# Patient Record
(demographics unavailable — no encounter records)

---

## 2024-12-12 NOTE — HISTORY OF PRESENT ILLNESS
[FreeTextEntry1] : 63 year old male patient increased risk for CRC (father with CRC), presents for screening colonoscopy. 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  She went to the ED on 11/21/24 due to severe pain in her RLQ. CT scan showed no acute abdominopelvic abnormalities but showed a 7 mm pancreatic hypodensity so MRI was recommended.   MRI Abdomen/MR Cholangiogram showed normal morphology of the liver with left hepatic lobe cysts up to 1.2 cm, normal GB, normal biliary ducts, and normal pancreas without any cystic lesions and normal PD.  She has not noted any further abdominal pain. She has changed her diet to low Carb but has frequent bloating postprandially. She reported occasional constipation; she does not take any laxatives; she took senokot 2 at hs prior to the colonoscopy with good response.  No weight loss, blood in the stool, heartburn. She noted 2 episodes of dysphagia which has improved. No choking, coughing, or vomiting noted.  EGD 12/4/24 Impressions: Normal mucosa in the whole esophagus. (Biopsy). Erythema in the stomach compatible with non-erosive gastritis. (Biopsy). Normal mucosa in the whole examined duodenum. (Biopsy). Biopsies were negative for HP, IM, Celiac Disease, and Dysplasia.  Colonoscopy 12/4/24 Impressions: External hemorrhoids. Polyp (6 mm) in the ascending colon. (Polypectomy). Polyp (10 mm) in the cecum. (Polypectomy). The colon and TI were otherwise unremarkable. Biopsies of the polyps were TAs without HGD.   Prior colonoscopy was done 10/21 and showed a rectal polyp which was hyperplastic.

## 2024-12-12 NOTE — PHYSICAL EXAM
[Alert] : alert [Normal Voice/Communication] : normal voice/communication [No Acute Distress] : no acute distress [Well Developed] : well developed [Well Nourished] : well nourished [Sclera] : the sclera and conjunctiva were normal [Hearing Threshold Finger Rub Not [place]] : hearing was normal [Normal Lips/Gums] : the lips and gums were normal [Normal Appearance] : the appearance of the neck was normal [No Neck Mass] : no neck mass was observed [No Respiratory Distress] : no respiratory distress [No Acc Muscle Use] : no accessory muscle use [Respiration, Rhythm And Depth] : normal respiratory rhythm and effort [Auscultation Breath Sounds / Voice Sounds] : lungs were clear to auscultation bilaterally [Heart Rate And Rhythm] : heart rate was normal and rhythm regular [Normal S1, S2] : normal S1 and S2 [Bowel Sounds] : normal bowel sounds [Abdomen Tenderness] : non-tender [No Masses] : no abdominal mass palpated [Abdomen Soft] : soft [Abnormal Walk] : normal gait [Normal Color / Pigmentation] : normal skin color and pigmentation [Oriented To Time, Place, And Person] : oriented to person, place, and time

## 2024-12-12 NOTE — ASSESSMENT
[FreeTextEntry1] : 64 year old male patient increased risk for CRC (father with CRC), presents for screening colonoscopy. 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      CRC screening, at increased risk for CRC Colon polyp, TAs without HGD - Colonoscopy results discussed - Repeat colonoscopy in 3 yrs (2027)   Dysphagia, non specific, better now - EGD results discussed and was unremarkable  Pancreatic hypodensity on CT scan MRI  of Abdomen with IV contrast showed no pancreatic cystic lesions or other abnormalities  Constipation due to IBS - She was told to take senokot 8.6 mg 2 tabs at hs - Phazyme 250 mg 2 tabs tid prn/.gas and/or bloating  - Low FOD MAP diet recommended for flare-ups of abd pain and bloating

## 2024-12-12 NOTE — ASSESSMENT
[FreeTextEntry1] : 64 year old male patient increased risk for CRC (father with CRC), presents for screening colonoscopy. 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      CRC screening, at increased risk for CRC Colon polyp, TAs without HGD - Colonoscopy results discussed - Repeat colonoscopy in 3 yrs (2027)   Dysphagia, non specific, better now - EGD results discussed and was unremarkable  Pancreatic hypodensity on CT scan MRI  of Abdomen with IV contrast showed no pancreatic cystic lesions or other abnormalities  Constipation due to IBS - She was told to take senokot 8.6 mg 2 tabs at hs - Phazyme 250 mg 2 tabs tid prn/.gas and/or bloating  - Low FOD MAP diet recommended for flare-ups of abd pain and bloating

## 2024-12-12 NOTE — PHYSICAL EXAM
[Alert] : alert [Normal Voice/Communication] : normal voice/communication [No Acute Distress] : no acute distress [Well Developed] : well developed [Well Nourished] : well nourished [Sclera] : the sclera and conjunctiva were normal [Hearing Threshold Finger Rub Not [place]] : hearing was normal [Normal Lips/Gums] : the lips and gums were normal [Normal Appearance] : the appearance of the neck was normal [No Neck Mass] : no neck mass was observed [No Respiratory Distress] : no respiratory distress [No Acc Muscle Use] : no accessory muscle use [Respiration, Rhythm And Depth] : normal respiratory rhythm and effort [Auscultation Breath Sounds / Voice Sounds] : lungs were clear to auscultation bilaterally [Heart Rate And Rhythm] : heart rate was normal and rhythm regular [Normal S1, S2] : normal S1 and S2 [Bowel Sounds] : normal bowel sounds [Abdomen Tenderness] : non-tender [No Masses] : no abdominal mass palpated [Abdomen Soft] : soft [Abnormal Walk] : normal gait [Normal Color / Pigmentation] : normal skin color and pigmentation [Oriented To Time, Place, And Person] : oriented to person, place, and time

## 2024-12-12 NOTE — HISTORY OF PRESENT ILLNESS
[FreeTextEntry1] : 63 year old male patient increased risk for CRC (father with CRC), presents for screening colonoscopy. 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  She went to the ED on 11/21/24 due to severe pain in her RLQ. CT scan showed no acute abdominopelvic abnormalities but showed a 7 mm pancreatic hypodensity so MRI was recommended.   MRI Abdomen/MR Cholangiogram showed normal morphology of the liver with left hepatic lobe cysts up to 1.2 cm, normal GB, normal biliary ducts, and normal pancreas without any cystic lesions and normal PD.  She has not noted any further abdominal pain. She has changed her diet to low Carb but has frequent bloating postprandially. She reported occasional constipation; she does not take any laxatives; she took senokot 2 at hs prior to the colonoscopy with good response.  No weight loss, blood in the stool, heartburn. She noted 2 episodes of dysphagia which has improved. No choking, coughing, or vomiting noted.  EGD 12/4/24 Impressions: Normal mucosa in the whole esophagus. (Biopsy). Erythema in the stomach compatible with non-erosive gastritis. (Biopsy). Normal mucosa in the whole examined duodenum. (Biopsy). Biopsies were negative for HP, IM, Celiac Disease, and Dysplasia.  Colonoscopy 12/4/24 Impressions: External hemorrhoids. Polyp (6 mm) in the ascending colon. (Polypectomy). Polyp (10 mm) in the cecum. (Polypectomy). The colon and TI were otherwise unremarkable. Biopsies of the polyps were TAs without HGD.   Prior colonoscopy was done 10/21 and showed a rectal polyp which was hyperplastic.

## 2024-12-12 NOTE — ASSESSMENT
[FreeTextEntry1] : 64 year old male patient increased risk for CRC (father with CRC), presents for screening colonoscopy. 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      CRC screening, at increased risk for CRC Colon polyp, TAs without HGD - Colonoscopy results discussed - Repeat colonoscopy in 3 yrs (2027)   Dysphagia, non specific, better now - EGD results discussed and was unremarkable  Pancreatic hypodensity on CT scan MRI  of Abdomen with IV contrast showed no pancreatic cystic lesions or other abnormalities  Constipation due to IBS - She was told to take senokot 8.6 mg 2 tabs at hs - Phazyme 250 mg 2 tabs tid prn/.gas and/or bloating  - Low FOD MAP diet recommended for flare-ups of abd pain and bloating

## 2024-12-12 NOTE — HISTORY OF PRESENT ILLNESS
[FreeTextEntry1] : 63 year old male patient increased risk for CRC (father with CRC), presents for screening colonoscopy. 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  She went to the ED on 11/21/24 due to severe pain in her RLQ. CT scan showed no acute abdominopelvic abnormalities but showed a 7 mm pancreatic hypodensity so MRI was recommended.   MRI Abdomen/MR Cholangiogram showed normal morphology of the liver with left hepatic lobe cysts up to 1.2 cm, normal GB, normal biliary ducts, and normal pancreas without any cystic lesions and normal PD.  She has not noted any further abdominal pain. She has changed her diet to low Carb but has frequent bloating postprandially. She reported occasional constipation; she does not take any laxatives; she took senokot 2 at hs prior to the colonoscopy with good response.  No weight loss, blood in the stool, heartburn. She noted 2 episodes of dysphagia which has improved. No choking, coughing, or vomiting noted.  EGD 12/4/24 Impressions: Normal mucosa in the whole esophagus. (Biopsy). Erythema in the stomach compatible with non-erosive gastritis. (Biopsy). Normal mucosa in the whole examined duodenum. (Biopsy). Biopsies were negative for HP, IM, Celiac Disease, and Dysplasia.  Colonoscopy 12/4/24 Impressions: External hemorrhoids. Polyp (6 mm) in the ascending colon. (Polypectomy). Polyp (10 mm) in the cecum. (Polypectomy). The colon and TI were otherwise unremarkable. Biopsies of the polyps were TAs without HGD.   Prior colonoscopy was done 10/21 and showed a rectal polyp which was hyperplastic.

## 2024-12-12 NOTE — PHYSICAL EXAM
[Alert] : alert [Normal Voice/Communication] : normal voice/communication [No Acute Distress] : no acute distress [Well Developed] : well developed [Well Nourished] : well nourished [Sclera] : the sclera and conjunctiva were normal [Hearing Threshold Finger Rub Not [place]] : hearing was normal [Normal Lips/Gums] : the lips and gums were normal [Normal Appearance] : the appearance of the neck was normal [No Neck Mass] : no neck mass was observed [No Respiratory Distress] : no respiratory distress [No Acc Muscle Use] : no accessory muscle use [Respiration, Rhythm And Depth] : normal respiratory rhythm and effort [Auscultation Breath Sounds / Voice Sounds] : lungs were clear to auscultation bilaterally [Heart Rate And Rhythm] : heart rate was normal and rhythm regular [Normal S1, S2] : normal S1 and S2 [Bowel Sounds] : normal bowel sounds [Abdomen Tenderness] : non-tender [No Masses] : no abdominal mass palpated [Abdomen Soft] : soft [Abnormal Walk] : normal gait [Normal Color / Pigmentation] : normal skin color and pigmentation [Oriented To Time, Place, And Person] : oriented to person, place, and time

## 2025-03-06 NOTE — PAST MEDICAL HISTORY
Vaccine Information Statement(s) or the Emergency Use Authorization was given today. This has been reviewed, questions answered, and verbal consent given by Patient for injection(s) and administration of Influenza (Inactivated).      Patient tolerated without incident. See immunization grid for documentation.     [Postmenopausal] : The patient is postmenopausal [Menarche Age ____] : age at menarche was [unfilled] [Menopause Age____] : age at menopause was [unfilled] [Total Preg ___] : G[unfilled]

## 2025-03-13 NOTE — PHYSICAL EXAM
[Normocephalic] : normocephalic [EOMI] : extra ocular movement intact [No Supraclavicular Adenopathy] : no supraclavicular adenopathy [No Cervical Adenopathy] : no cervical adenopathy [Examined in the supine and seated position] : examined in the supine and seated position [No dominant masses] : no dominant masses in right breast  [No dominant masses] : no dominant masses left breast [No Nipple Retraction] : no left nipple retraction [No Nipple Discharge] : no left nipple discharge [No Axillary Lymphadenopathy] : no left axillary lymphadenopathy [No Rashes] : no rashes [No Ulceration] : no ulceration [de-identified] : Nl respirations

## 2025-03-13 NOTE — PHYSICAL EXAM
[Normocephalic] : normocephalic [EOMI] : extra ocular movement intact [No Supraclavicular Adenopathy] : no supraclavicular adenopathy [No Cervical Adenopathy] : no cervical adenopathy [Examined in the supine and seated position] : examined in the supine and seated position [No dominant masses] : no dominant masses in right breast  [No dominant masses] : no dominant masses left breast [No Nipple Retraction] : no left nipple retraction [No Nipple Discharge] : no left nipple discharge [No Axillary Lymphadenopathy] : no left axillary lymphadenopathy [No Rashes] : no rashes [No Ulceration] : no ulceration [de-identified] : Nl respirations

## 2025-03-13 NOTE — PHYSICAL EXAM
[Normocephalic] : normocephalic [EOMI] : extra ocular movement intact [No Supraclavicular Adenopathy] : no supraclavicular adenopathy [No Cervical Adenopathy] : no cervical adenopathy [Examined in the supine and seated position] : examined in the supine and seated position [No dominant masses] : no dominant masses in right breast  [No dominant masses] : no dominant masses left breast [No Nipple Retraction] : no left nipple retraction [No Nipple Discharge] : no left nipple discharge [No Axillary Lymphadenopathy] : no left axillary lymphadenopathy [No Rashes] : no rashes [No Ulceration] : no ulceration [de-identified] : Nl respirations
